# Patient Record
Sex: MALE | HISPANIC OR LATINO | Employment: FULL TIME | URBAN - METROPOLITAN AREA
[De-identification: names, ages, dates, MRNs, and addresses within clinical notes are randomized per-mention and may not be internally consistent; named-entity substitution may affect disease eponyms.]

---

## 2023-12-08 ENCOUNTER — OFFICE VISIT (OUTPATIENT)
Dept: FAMILY MEDICINE CLINIC | Facility: CLINIC | Age: 33
End: 2023-12-08
Payer: COMMERCIAL

## 2023-12-08 VITALS
HEART RATE: 82 BPM | WEIGHT: 242 LBS | OXYGEN SATURATION: 96 % | RESPIRATION RATE: 18 BRPM | SYSTOLIC BLOOD PRESSURE: 126 MMHG | DIASTOLIC BLOOD PRESSURE: 74 MMHG | BODY MASS INDEX: 37.98 KG/M2 | HEIGHT: 67 IN | TEMPERATURE: 98.4 F

## 2023-12-08 DIAGNOSIS — E66.9 OBESITY (BMI 30-39.9): ICD-10-CM

## 2023-12-08 DIAGNOSIS — F51.01 PRIMARY INSOMNIA: Primary | ICD-10-CM

## 2023-12-08 DIAGNOSIS — R03.0 TRANSIENT ELEVATED BLOOD PRESSURE: ICD-10-CM

## 2023-12-08 NOTE — PROGRESS NOTES
Chief Complaint  Establish Care (Transferring from [place], only saw his PCP a couple of times.  Not getting solid amounts of sleep, tends to sleep no more than 4 hours straight at a time.)    Subjective          Chano Webb presents to [facility]  History of Present Illness    While he was being seen at work for an injury, his blood pressure was elevated.   He has history of HTN, however no longer taking medication to treat.   Denies headaches, but has had increased fatigue over the last few months.     He is not getting good sleep, usually only 3-4 hours at a time.   Hard for him to fall asleep  He has not tried melatonin or other OTC options.     The following portions of the patient's history were reviewed and updated as appropriate: allergies, current medications, past family history, past medical history, past social history, past surgical history, and problem list.    Objective      Physical Exam  Vitals reviewed.   Constitutional:       Appearance: Normal appearance.   HENT:      Right Ear: Tympanic membrane, ear canal and external ear normal.      Left Ear: Tympanic membrane, ear canal and external ear normal.   Cardiovascular:      Rate and Rhythm: Normal rate.      Heart sounds: Normal heart sounds.   Pulmonary:      Effort: Pulmonary effort is normal.      Breath sounds: Normal breath sounds.   Neurological:      Mental Status: He is alert.   Psychiatric:         Behavior: Behavior normal.        Result Review :                Assessment and Plan    Diagnoses and all orders for this visit:    1. Primary insomnia (Primary)  Comments:  Try melatonin. If ineffective, he will follow up for prescription  Orders:  -     CBC (No Diff)  -     Comprehensive Metabolic Panel  -     Iron  -     Vitamin D,25-Hydroxy  -     Vitamin B12  -     TSH+Free T4  -     Hemoglobin A1c  -     Testosterone    2. Transient elevated blood pressure  Comments:  He will monitor blood  pressure over the next 1-2 weeks and report readings  Orders:  -     CBC (No Diff)  -     Comprehensive Metabolic Panel  -     Iron  -     Vitamin D,25-Hydroxy  -     Vitamin B12  -     TSH+Free T4  -     Hemoglobin A1c  -     Testosterone    3. Obesity (BMI 30-39.9)  -     CBC (No Diff)  -     Comprehensive Metabolic Panel  -     Iron  -     Vitamin D,25-Hydroxy  -     Vitamin B12  -     TSH+Free T4  -     Hemoglobin A1c  -     Testosterone    4. Body mass index (BMI) of 37.0 to 37.9 in adult  -     CBC (No Diff)  -     Comprehensive Metabolic Panel  -     Iron  -     Vitamin D,25-Hydroxy  -     Vitamin B12  -     TSH+Free T4  -     Hemoglobin A1c  -     Testosterone        Follow Up   No follow-ups on file.  Patient was given instructions and counseling regarding his condition or for health maintenance advice. Please see specific information pulled into the AVS if appropriate.

## 2023-12-09 LAB
25(OH)D3+25(OH)D2 SERPL-MCNC: 12.4 NG/ML (ref 30–100)
ALBUMIN SERPL-MCNC: 4.9 G/DL (ref 4.1–5.1)
ALBUMIN/GLOB SERPL: 2.2 {RATIO} (ref 1.2–2.2)
ALP SERPL-CCNC: 90 IU/L (ref 44–121)
ALT SERPL-CCNC: 43 IU/L (ref 0–44)
AST SERPL-CCNC: 27 IU/L (ref 0–40)
BILIRUB SERPL-MCNC: 0.4 MG/DL (ref 0–1.2)
BUN SERPL-MCNC: 14 MG/DL (ref 6–20)
BUN/CREAT SERPL: 13 (ref 9–20)
CALCIUM SERPL-MCNC: 9.8 MG/DL (ref 8.7–10.2)
CHLORIDE SERPL-SCNC: 101 MMOL/L (ref 96–106)
CO2 SERPL-SCNC: 24 MMOL/L (ref 20–29)
CREAT SERPL-MCNC: 1.1 MG/DL (ref 0.76–1.27)
EGFRCR SERPLBLD CKD-EPI 2021: 90 ML/MIN/1.73
ERYTHROCYTE [DISTWIDTH] IN BLOOD BY AUTOMATED COUNT: 12.6 % (ref 11.6–15.4)
GLOBULIN SER CALC-MCNC: 2.2 G/DL (ref 1.5–4.5)
GLUCOSE SERPL-MCNC: 82 MG/DL (ref 70–99)
HBA1C MFR BLD: 5.8 % (ref 4.8–5.6)
HCT VFR BLD AUTO: 46.4 % (ref 37.5–51)
HGB BLD-MCNC: 15.9 G/DL (ref 13–17.7)
IRON SERPL-MCNC: 100 UG/DL (ref 38–169)
MCH RBC QN AUTO: 31.3 PG (ref 26.6–33)
MCHC RBC AUTO-ENTMCNC: 34.3 G/DL (ref 31.5–35.7)
MCV RBC AUTO: 91 FL (ref 79–97)
PLATELET # BLD AUTO: 260 X10E3/UL (ref 150–450)
POTASSIUM SERPL-SCNC: 4.3 MMOL/L (ref 3.5–5.2)
PROT SERPL-MCNC: 7.1 G/DL (ref 6–8.5)
RBC # BLD AUTO: 5.08 X10E6/UL (ref 4.14–5.8)
SODIUM SERPL-SCNC: 139 MMOL/L (ref 134–144)
T4 FREE SERPL-MCNC: 1.13 NG/DL (ref 0.82–1.77)
TESTOST SERPL-MCNC: 157 NG/DL (ref 264–916)
TSH SERPL DL<=0.005 MIU/L-ACNC: 3.34 UIU/ML (ref 0.45–4.5)
VIT B12 SERPL-MCNC: 724 PG/ML (ref 232–1245)
WBC # BLD AUTO: 6.6 X10E3/UL (ref 3.4–10.8)

## 2023-12-12 DIAGNOSIS — R79.89 LOW TESTOSTERONE IN MALE: Primary | ICD-10-CM

## 2023-12-12 DIAGNOSIS — Z12.5 SCREENING FOR PROSTATE CANCER: ICD-10-CM

## 2023-12-12 DIAGNOSIS — E55.9 VITAMIN D DEFICIENCY: ICD-10-CM

## 2023-12-12 RX ORDER — ERGOCALCIFEROL 1.25 MG/1
50000 CAPSULE ORAL WEEKLY
Qty: 4 CAPSULE | Refills: 3 | Status: SHIPPED | OUTPATIENT
Start: 2023-12-12

## 2023-12-13 ENCOUNTER — PATIENT ROUNDING (BHMG ONLY) (OUTPATIENT)
Dept: FAMILY MEDICINE CLINIC | Facility: CLINIC | Age: 33
End: 2023-12-13
Payer: COMMERCIAL

## 2023-12-13 NOTE — PROGRESS NOTES
A My-Chart message has been sent to the patient for [place] [facility] [place]

## 2024-01-26 ENCOUNTER — LAB (OUTPATIENT)
Dept: FAMILY MEDICINE CLINIC | Facility: CLINIC | Age: 34
End: 2024-01-26
Payer: COMMERCIAL

## 2024-01-26 DIAGNOSIS — R79.89 LOW TESTOSTERONE IN MALE: ICD-10-CM

## 2024-01-26 DIAGNOSIS — Z12.5 SCREENING FOR PROSTATE CANCER: ICD-10-CM

## 2024-01-27 LAB
PSA SERPL-MCNC: 2.7 NG/ML (ref 0–4)
TESTOST SERPL-MCNC: 214 NG/DL (ref 264–916)

## 2024-01-29 DIAGNOSIS — R79.89 LOW TESTOSTERONE IN MALE: Primary | ICD-10-CM

## 2024-01-29 RX ORDER — TESTOSTERONE CYPIONATE 200 MG/ML
200 INJECTION, SOLUTION INTRAMUSCULAR
Qty: 1 ML | Refills: 2 | Status: SHIPPED | OUTPATIENT
Start: 2024-01-29

## 2024-01-29 RX ORDER — TESTOSTERONE CYPIONATE 200 MG/ML
200 INJECTION, SOLUTION INTRAMUSCULAR
Status: SHIPPED | OUTPATIENT
Start: 2024-01-29

## 2024-01-30 ENCOUNTER — PRIOR AUTHORIZATION (OUTPATIENT)
Dept: FAMILY MEDICINE CLINIC | Facility: CLINIC | Age: 34
End: 2024-01-30
Payer: COMMERCIAL

## 2024-02-06 ENCOUNTER — TELEPHONE (OUTPATIENT)
Dept: FAMILY MEDICINE CLINIC | Facility: CLINIC | Age: 34
End: 2024-02-06
Payer: COMMERCIAL

## 2024-02-06 NOTE — TELEPHONE ENCOUNTER
Caller: PIETER JAIN    Relationship: Emergency Contact    Best call back number: [phone]     Which medication are you concerned about: TESTOSTERONE    Who prescribed you this medication: DR RASHEED    When did you start taking this medication: HAS NOT YET    What are your concerns: PATIENT PICKED UP HIS PRESCRIPTION FOR TESTOSTERONE, HOWEVER, THE PHARMACY ADVISED THAT BECAUSE OF HOW THE PRESCRIPTION WAS WRITTEN, THEY ARE UNABLE TO PROVIDE HIM WITH A SYRINGE AND/OR MULTIPLE SYRINGES.    THEY ADVISED HIM TO CONTACT DR RASHEED AND GET CLARIFICATION ON WHETHER DR RASHEED WANTED FOR HIM TO ADMINISTER HIS INJECTIONS AT HOME OR IF HE WAS TO BRING IN DOSES OF THE MEDICATION AND HAVE THEM ADMINISTERED AT THE OFFICE.    PLEASE ADVISE

## 2024-02-07 ENCOUNTER — CLINICAL SUPPORT (OUTPATIENT)
Dept: FAMILY MEDICINE CLINIC | Facility: CLINIC | Age: 34
End: 2024-02-07
Payer: COMMERCIAL

## 2024-02-07 DIAGNOSIS — R79.89 LOW TESTOSTERONE IN MALE: Primary | ICD-10-CM

## 2024-02-07 RX ADMIN — TESTOSTERONE CYPIONATE 200 MG: 200 INJECTION, SOLUTION INTRAMUSCULAR at 13:08

## 2024-02-28 DIAGNOSIS — R79.89 LOW TESTOSTERONE IN MALE: ICD-10-CM

## 2024-02-28 DIAGNOSIS — E55.9 VITAMIN D DEFICIENCY: ICD-10-CM

## 2024-02-28 RX ORDER — ERGOCALCIFEROL 1.25 MG/1
50000 CAPSULE ORAL WEEKLY
Qty: 4 CAPSULE | Refills: 3 | OUTPATIENT
Start: 2024-02-28

## 2024-02-28 RX ORDER — TESTOSTERONE CYPIONATE 200 MG/ML
200 INJECTION, SOLUTION INTRAMUSCULAR
Qty: 1 ML | Refills: 2 | OUTPATIENT
Start: 2024-02-28

## 2024-02-28 NOTE — TELEPHONE ENCOUNTER
Testosterone prescription has 2 refills remaining, contact pharmacy to refill. Vitamin d should also have refills remaining.     Repeat labs in April and office visit is due in July.

## 2024-04-15 ENCOUNTER — CLINICAL SUPPORT (OUTPATIENT)
Dept: FAMILY MEDICINE CLINIC | Facility: CLINIC | Age: 34
End: 2024-04-15
Payer: COMMERCIAL

## 2024-04-15 DIAGNOSIS — R79.89 LOW TESTOSTERONE IN MALE: Primary | ICD-10-CM

## 2024-04-15 PROCEDURE — 96372 THER/PROPH/DIAG INJ SC/IM: CPT | Performed by: FAMILY MEDICINE

## 2024-04-15 RX ADMIN — TESTOSTERONE CYPIONATE 200 MG: 200 INJECTION, SOLUTION INTRAMUSCULAR at 14:03
